# Patient Record
Sex: FEMALE | Race: WHITE
[De-identification: names, ages, dates, MRNs, and addresses within clinical notes are randomized per-mention and may not be internally consistent; named-entity substitution may affect disease eponyms.]

---

## 2017-06-21 ENCOUNTER — HOSPITAL ENCOUNTER (OUTPATIENT)
Dept: HOSPITAL 80 - FIMAGING | Age: 67
End: 2017-06-21
Attending: INTERNAL MEDICINE
Payer: COMMERCIAL

## 2017-06-21 DIAGNOSIS — Z12.31: Primary | ICD-10-CM

## 2017-06-21 PROCEDURE — G0202 SCR MAMMO BI INCL CAD: HCPCS

## 2017-09-20 ENCOUNTER — HOSPITAL ENCOUNTER (EMERGENCY)
Dept: HOSPITAL 80 - FED | Age: 67
Discharge: HOME | End: 2017-09-20
Payer: COMMERCIAL

## 2017-09-20 VITALS — SYSTOLIC BLOOD PRESSURE: 141 MMHG | DIASTOLIC BLOOD PRESSURE: 66 MMHG | TEMPERATURE: 98.1 F

## 2017-09-20 VITALS — HEART RATE: 71 BPM | RESPIRATION RATE: 16 BRPM | OXYGEN SATURATION: 97 %

## 2017-09-20 DIAGNOSIS — E86.9: ICD-10-CM

## 2017-09-20 DIAGNOSIS — I10: ICD-10-CM

## 2017-09-20 DIAGNOSIS — F41.9: ICD-10-CM

## 2017-09-20 DIAGNOSIS — B34.9: Primary | ICD-10-CM

## 2017-09-20 LAB
% IMMATURE GRANULYOCYTES: 0.2 % (ref 0–1.1)
ABSOLUTE IMMATURE GRANULOCYTES: 0.02 10^3/UL (ref 0–0.1)
ABSOLUTE NRBC COUNT: 0 10^3/UL (ref 0–0.01)
ADD DIFF?: NO
ADD MORPH?: NO
ADD SCAN?: NO
ANION GAP SERPL CALC-SCNC: 14 MEQ/L (ref 8–16)
ATYPICAL LYMPHOCYTE FLAG: 20 (ref 0–99)
CALCIUM SERPL-MCNC: 10 MG/DL (ref 8.5–10.4)
CHLORIDE SERPL-SCNC: 105 MEQ/L (ref 97–110)
CO2 SERPL-SCNC: 24 MEQ/L (ref 22–31)
CREAT SERPL-MCNC: 0.9 MG/DL (ref 0.6–1)
ERYTHROCYTE [DISTWIDTH] IN BLOOD BY AUTOMATED COUNT: 13.2 % (ref 11.5–15.2)
FRAGMENT RBC FLAG: 0 (ref 0–99)
GFR SERPL CREATININE-BSD FRML MDRD: > 60 ML/MIN/{1.73_M2}
GLUCOSE SERPL-MCNC: 123 MG/DL (ref 70–100)
HCT VFR BLD CALC: 45.8 % (ref 38–47)
HGB BLD-MCNC: 15.2 G/DL (ref 12.6–16.3)
LEFT SHIFT FLG: 0 (ref 0–99)
LIPEMIA HEMOLYSIS FLAG: 80 (ref 0–99)
MCH RBC BLDCO QN: 30.3 PG (ref 27.9–34.1)
MCHC RBC AUTO-ENTMCNC: 33.2 G/DL (ref 32.4–36.7)
MCV RBC AUTO: 91.2 FL (ref 81.5–99.8)
NRBC-AUTO%: 0 % (ref 0–0.2)
PLATELET # BLD: 255 10^3/UL (ref 150–400)
PLATELET CLUMPS FLAG: 0 (ref 0–99)
PMV BLD AUTO: 10.1 FL (ref 8.7–11.7)
POTASSIUM SERPL-SCNC: 4.1 MEQ/L (ref 3.5–5.2)
RBC # BLD AUTO: 5.02 10^6/UL (ref 4.18–5.33)
SODIUM SERPL-SCNC: 143 MEQ/L (ref 134–144)

## 2017-09-20 PROCEDURE — 96374 THER/PROPH/DIAG INJ IV PUSH: CPT

## 2017-09-20 PROCEDURE — 99285 EMERGENCY DEPT VISIT HI MDM: CPT

## 2017-09-20 PROCEDURE — 93005 ELECTROCARDIOGRAM TRACING: CPT

## 2017-09-20 PROCEDURE — 71020: CPT

## 2017-09-20 PROCEDURE — 96361 HYDRATE IV INFUSION ADD-ON: CPT

## 2017-09-20 NOTE — CPEKG
Heart Rate: 90

RR Interval: 667

P-R Interval: 192

QRSD Interval: 78

QT Interval: 384

QTC Interval: 470

P Axis: 60

QRS Axis: 32

T Wave Axis: 36

EKG Severity - NORMAL ECG -

EKG Impression: SINUS RHYTHM

Electronically Signed By: Guero Quan 20-Sep-2017 19:43:40

## 2017-09-20 NOTE — EDPHY
H & P


Stated Complaint: feeling shakey, high BP


Time Seen by Provider: 09/20/17 19:15


HPI/ROS: 





CHIEF COMPLAINT:  Shaky, cold, anxious





HISTORY OF PRESENT ILLNESS:  The patient is a 67-year-old female with a history 

of anxiety.  She takes Ativan nightly to help her sleep.  She has tapered from 

1.5 mg to 1 mg over the last several months.  She has been taking 1 mg for the 

last 2 weeks.  She states that this morning she began feeling shaky, cold and 

tight all over her body.  She then took her blood pressure this evening and 

noticed that it was elevated.  Her  brought her to the emergency 

department.  She denies chest pain or shortness of breath.  She denies nausea 

vomiting or diarrhea.  No fever.  No cough.  No headache.  No lightheadedness.








REVIEW OF SYSTEMS:


Constitutional:  See HPI


EENTM: denies: blurred vision, double vision, nose congestion


Respiratory: denies: cough, shortness of breath


Cardiac: denies: chest pain, irregular heart rate, lightheadedness, palpitations


Gastrointestinal/Abdominal: denies: abdominal pain, diarrhea, nausea, vomiting, 

blood streaked stools


Genitourinary: denies: dysuria, frequency, hematuria, pain


Musculoskeletal: denies: joint pain, muscle pain


Skin: denies: lesions, rash, jaundice, bruising


Neurological: denies: headache, numbness, paresthesia, tingling, dizziness, 

weakness


Hematologic/Lymphatic: denies: blood clots, easy bleeding, easy bruising


Immunologic/allergic: denies: HIV/AIDS, transplant








EXAM:


GENERAL:  Anxious,  well-nourished 


HEAD:  Atraumatic, normocephalic.


EYES:  Pupils equal round and reactive to light, extraocular movements intact, 

sclera anicteric, conjunctiva are normal.


ENT:  TMs normal, nares patent, oropharynx clear without exudates.  Moist 

mucous membranes.


NECK:  Normal range of motion, supple without lymphadenopathy or JVD.


LUNGS:  Breath sounds clear to auscultation bilaterally and equal.  No wheezes 

rales or rhonchi.


HEART:  Regular rate and rhythm without murmurs, rubs or gallops.


ABDOMEN:  Soft, nontender, normoactive bowel sounds.  No guarding, no rebound.  

No masses appreciated.


BACK:  No CVA tenderness, no spinal tenderness, step-offs or deformities


EXTREMITIES:  Normal range of motion, no pitting or edema.  No clubbing or 

cyanosis.


NEUROLOGICAL:  Cranial nerves II through XII grossly intact.  Normal speech, 

normal gait.  5/5 strength, normal movement in all extremities, normal sensation


PSYCH:  Normal mood, normal affect.


SKIN:  Warm, dry, normal turgor, no visible rashes or lesions.








Source: Patient


Exam Limitations: No limitations





- Personal History


Current Tetanus/Diphtheria Vaccine: Yes


Current Tetanus Diphtheria and Acellular Pertussis (TDAP): Yes


Tetanus Vaccine Date: 2010





- Medical/Surgical History


Hx Asthma: No


Hx Chronic Respiratory Disease: No


Hx Diabetes: No


Hx Cardiac Disease: No


Hx Renal Disease: No


Hx Cirrhosis: No


Hx Alcoholism: No


Hx HIV/AIDS: No


Hx Splenectomy or Spleen Trauma: No


Other PMH: SDH 11-13, appendectomy hysterectomy.  ovaries removed. Gallbladder 

out 2014.  PMH: anxiety; cyclical depression





- Family History


Significant Family History: No pertinent family hx





- Social History


Smoking Status: Never smoked


Alcohol Use: None


Drug Use: None


Constitutional: 


 Initial Vital Signs











Temperature (C)  36.5 C   09/20/17 19:19


 


Heart Rate  99   09/20/17 19:19


 


Respiratory Rate  16   09/20/17 19:19


 


Blood Pressure  174/85 H  09/20/17 19:19


 


O2 Sat (%)  98   09/20/17 19:19








 











O2 Delivery Mode               Room Air














Allergies/Adverse Reactions: 


 





doxycycline Allergy (Verified 09/20/17 19:18)


 


Penicillins Allergy (Verified 09/20/17 19:18)


 








Home Medications: 














 Medication  Instructions  Recorded


 


Cetirizine [ZyRTEC] 10 mg PO HS 04/09/13


 


Estrogens, Conjugated [Premarin] 0.625 mg PO DAILY 11/19/13


 


LORazepam [Ativan] 0.5 mg PO DAILY PRN 10/14/14


 


LaMICtal  01/14/16














Medical Decision Making





- Diagnostics


EKG Interpretation: 





An EKG obtained and was read and documented in trace view.  Please see trace 

view for full reading and report.  Sinus rhythm, no acute ischemic changes 


ED Course/Re-evaluation: 





9:15 p.m. the patient is feeling completely better.  She thinks the Ativan and 

fluids helped tremendously.  I suspect that she might be starting to have a 

viral syndrome with her chills and dry cough exacerbated by anxiety and 

hypertension.  The patient agrees with this as does her .  Her flu test 

will not be back for another 15 or 20 minutes.  I spoke with Dr. Tremayne Kelly 

who will prescribe for Tamiflu if needed.  Patient and  agree with this.


Differential Diagnosis: 





Partial list of the Differential diagnosis considered include but were not 

limited to;  viral syndrome, anxiety, hypertension and although unlikely based 

on the history and physical exam, I also considered acute coronary disease, PE, 

arrhythmia, head injury.  I discussed these differential diagnoses and the plan 

with the patient as well as the usual and expected course.  The patient 

understands that the diagnosis is provisional and that in medicine we are not 

always correct and that further workup is often warranted.  Usual and customary 

warnings were given.  All of the patient's questions were answered.  The 

patient was instructed to return to the emergency department should the 

symptoms at all worsen or return, otherwise to followup with the physician as 

we discussed.





- Data Points


Laboratory Results: 


 Laboratory Results





 09/20/17 19:30 





 09/20/17 19:30 





 











  09/20/17





  19:30


 


TSH  Pending 





  











Medications Given: 


 








Discontinued Medications





Sodium Chloride (Ns)  1,000 mls @ 0 mls/hr IV EDNOW ONE; Wide Open


   PRN Reason: Protocol


   Stop: 09/20/17 19:25


   Last Admin: 09/20/17 19:38 Dose:  1,000 mls


Lorazepam (Ativan Injection)  0.5 mg IVP EDNOW ONE


   Stop: 09/20/17 19:25


   Last Admin: 09/20/17 19:41 Dose:  0.5 mg








Departure





- Departure


Disposition: Home, Routine, Self-Care


Clinical Impression: 


 Viral syndrome, Anxiety





Hypertension


Qualifiers:


 Hypertension type: unspecified Qualified Code(s): I10 - Essential (primary) 

hypertension





Condition: Fair


Instructions:  Viral Syndrome (ED), Hypertension (ED), Anxiety (ED)


Referrals: 


Magalie Vanegas MD [Primary Care Provider] - As per Instructions

## 2018-01-09 ENCOUNTER — HOSPITAL ENCOUNTER (EMERGENCY)
Dept: HOSPITAL 80 - FED | Age: 68
Discharge: HOME | End: 2018-01-09
Payer: COMMERCIAL

## 2018-01-09 VITALS — TEMPERATURE: 97.7 F | OXYGEN SATURATION: 95 %

## 2018-01-09 VITALS — SYSTOLIC BLOOD PRESSURE: 144 MMHG | DIASTOLIC BLOOD PRESSURE: 88 MMHG | HEART RATE: 100 BPM | RESPIRATION RATE: 16 BRPM
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
## 2018-01-09 DIAGNOSIS — S16.1XXA: Primary | ICD-10-CM
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
## 2018-05-27 ENCOUNTER — HOSPITAL ENCOUNTER (EMERGENCY)
Dept: HOSPITAL 80 - FED | Age: 68
Discharge: HOME | End: 2018-05-27
Payer: COMMERCIAL

## 2018-05-27 VITALS — DIASTOLIC BLOOD PRESSURE: 72 MMHG | SYSTOLIC BLOOD PRESSURE: 131 MMHG

## 2018-05-27 DIAGNOSIS — Z76.0: Primary | ICD-10-CM

## 2018-05-27 LAB — PLATELET # BLD: 232 10^3/UL (ref 150–400)

## 2018-05-27 NOTE — EDPHY
H & P


Time Seen by Provider: 05/27/18 14:56


HPI/ROS: 





HPI


Fatigue, nausea.





68-year-old female by private vehicle with her .  This patient has a 

history of chronic depression and anxiety.  She has been taking Lamictal as 

well as lorazepam at night for 3 years.  She had no event in her family which 

caused her depression to worsen several weeks ago.  She was started on 

Trileptal 150 mg daily 2 weeks ago.  This dose has been increased to 300 mg 

daily.  She takes this medication twice daily.  She reports that over the last 

2 days and several days since starting the higher dose she has had nausea which 

is worse in the morning but no vomiting, chronic fatigue, intermittent problems 

with balance.  She spoke with the on-call psychiatrist Dr. Carey last night 

and was instructed to stop taking the Trileptal twice daily and just take it at 

night.  She did not take a dose this morning but did take a dose last night.





ROS:





Constitutional:  No fever, no chills.  As above.


Eyes:  No discharge.  No changes in vision.


ENT:  No sore throat.  No nasal congestion or rhinorrhea.


Respiratory:  No cough.  No shortness of breath.


Cardiac:  No chest pain, no palpitations.


Gastrointestinal:  No abdominal pain, no vomiting, no diarrhea.  As above.


Genitourinary:  No hematuria.  No dysuria or increased frequency with urination.


Musculoskeletal:  No back pain.  No neck pain.  No myalgias or arthralgias.


Skin:  No rashes.


Neurological:  No headache.  No focal weakness or altered sensation.  As above.





Past medical history:  Appendectomy, hysterectomy, oophorectomy, cholecystectomy

, anxiety, cyclical depression.





Social history:  Here with her .  Denies alcohol.  Nonsmoker.





Physical Exam:





General Appearance:  Alert, no distress.  This patient is responding to 

questions appropriately and in full sentences.  This patient appears well-

hydrated and well-nourished.


Eyes:  Pupils equal and round at 3-2 mm bilaterally, no pallor or injection.  

No lid edema, erythema or injection.  No nystagmus.


Respiratory:  There are no retractions, lungs are clear to auscultation with 

good air movement bilaterally.


Cardiovascular:  Regular rate and rhythm.  No murmur.


Gastrointestinal:  Abdomen is soft and nontender, no masses, bowel sounds 

normal.  No focal tenderness at McBurney's point.  No Encarnacion sign.


Neurological:  Motor sensory function is grossly intact.  Cranial nerves are 

normal.  Gait is normal.


Skin:  Warm and dry, no rashes.


Musculoskeletal:  Neck is supple and nontender.


Extremities are symmetrical.  All joints range without pain or impingement.


Psychiatric:  No agitation.  No depression.





Database:





EKG:





EKG time is 5:00 p.m.; EKG shows a narrow complex normal sinus rhythm with a 

ventricular rate of 63.  The VT, QRS, QT intervals are within normal limits.  

There are no ST-T wave changes indicative of ischemic or injury pattern.  No 

evidence of right heart strain.  Interpreted by me.





Imaging:





Procedures:





Emergency department course:





Vital signs reviewed.  The patient is mildly hypertensive.  Vital signs are 

otherwise normal.  Her presentation is consistent with a medication reaction 

and fits the side effect profile in particular of Trileptal.  I feel that CVA 

or other acute organic neurologic process is unlikely.





5:00 p.m., the patient was re-evaluated.  She is resting comfortably at this 

time.  No change in her neurologic status.  Results of her emergency department 

workup discussed with her.  She feels comfortable going home and I feel she is 

safe for discharge.  She will continue the once daily dosing, down from twice 

daily and speak with her psychiatrist or the on-call psychiatrist this evening 

or tomorrow about tapering this does down and finding a replacement medication.

  Follow-up was reviewed with her thoroughly.  Return to emergency department 

precautions discussed.  All of her questions were answered.  She was discharged 

in good condition from the emergency department.





Differential Diagnosis:





The differential diagnosis on this patient includes but is not limited to 

medication reaction.  CVA, serotonin syndrome, neuroleptic malignant syndrome, 

central versus peripheral vertigo, infectious etiology unlikely.  This 

represents a partial list of diagnoses considered.  These considerations are 

based on history, physical exam, past history, reassessment and diagnostic 

testing.


Smoking Status: Never smoked


Constitutional: 


 Initial Vital Signs











Temperature (C)  36.8 C   05/27/18 14:39


 


Heart Rate  65   05/27/18 14:39


 


Respiratory Rate  16   05/27/18 14:39


 


Blood Pressure  140/75 H  05/27/18 14:39


 


O2 Sat (%)  96   05/27/18 14:39








 











O2 Delivery Mode               Room Air














Allergies/Adverse Reactions: 


 





doxycycline Allergy (Verified 05/27/18 14:38)


 








Home Medications: 














 Medication  Instructions  Recorded


 


Cetirizine [ZyRTEC] 10 mg PO HS 04/09/13


 


Estrogens, Conjugated [Premarin] 0.625 mg PO DAILY 11/19/13


 


LORazepam [Ativan] 0.5 mg PO DAILY PRN 10/14/14


 


LaMICtal  01/14/16


 


Oxcarbazepine  05/27/18














Medical Decision Making





- Data Points


Laboratory Results: 


 Laboratory Results





 05/27/18 15:00 





 05/27/18 15:00 





 











  05/27/18 05/27/18 05/27/18





  15:00 15:00 15:00


 


WBC      5.97 10^3/uL 10^3/uL





     (3.80-9.50) 


 


RBC      4.72 10^6/uL 10^6/uL





     (4.18-5.33) 


 


Hgb      14.1 g/dL g/dL





     (12.6-16.3) 


 


Hct      43.3 % %





     (38.0-47.0) 


 


MCV      91.7 fL fL





     (81.5-99.8) 


 


MCH      29.9 pg pg





     (27.9-34.1) 


 


MCHC      32.6 g/dL g/dL





     (32.4-36.7) 


 


RDW      13.5 % %





     (11.5-15.2) 


 


Plt Count      232 10^3/uL 10^3/uL





     (150-400) 


 


MPV      9.6 fL fL





     (8.7-11.7) 


 


Neut % (Auto)      58.2 % %





     (39.3-74.2) 


 


Lymph % (Auto)      30.0 % %





     (15.0-45.0) 


 


Mono % (Auto)      8.2 % %





     (4.5-13.0) 


 


Eos % (Auto)      2.3 % %





     (0.6-7.6) 


 


Baso % (Auto)      1.0 % %





     (0.3-1.7) 


 


Nucleat RBC Rel Count      0.0 % %





     (0.0-0.2) 


 


Absolute Neuts (auto)      3.47 10^3/uL 10^3/uL





     (1.70-6.50) 


 


Absolute Lymphs (auto)      1.79 10^3/uL 10^3/uL





     (1.00-3.00) 


 


Absolute Monos (auto)      0.49 10^3/uL 10^3/uL





     (0.30-0.80) 


 


Absolute Eos (auto)      0.14 10^3/uL 10^3/uL





     (0.03-0.40) 


 


Absolute Basos (auto)      0.06 10^3/uL 10^3/uL





     (0.02-0.10) 


 


Absolute Nucleated RBC      0.00 10^3/uL 10^3/uL





     (0-0.01) 


 


Immature Gran %      0.3 % %





     (0.0-1.1) 


 


Immature Gran #      0.02 10^3/uL 10^3/uL





     (0.00-0.10) 


 


Sodium    143 mEq/L mEq/L  





    (135-145)  


 


Potassium    4.2 mEq/L mEq/L  





    (3.3-5.0)  


 


Chloride    103 mEq/L mEq/L  





    ()  


 


Carbon Dioxide    29 mEq/l mEq/l  





    (22-31)  


 


Anion Gap    11 mEq/L mEq/L  





    (8-16)  


 


BUN    19 mg/dL mg/dL  





    (7-23)  


 


Creatinine    0.8 mg/dL mg/dL  





    (0.6-1.0)  


 


Estimated GFR    > 60   





    


 


Glucose    93 mg/dL mg/dL  





    ()  


 


Calcium    9.5 mg/dL mg/dL  





    (8.5-10.4)  


 


Total Bilirubin  0.5 mg/dL mg/dL    





   (0.1-1.4)   


 


Conjugated Bilirubin  0.5 mg/dL mg/dL    





   (0.0-0.5)   


 


Unconjugated Bilirubin  0.0 mg/dL mg/dL    





   (0.0-1.1)   


 


AST  26 IU/L IU/L    





   (14-46)   


 


ALT  27 IU/L IU/L    





   (9-52)   


 


Alkaline Phosphatase  69 IU/L IU/L    





   ()   


 


Total Protein  7.0 g/dL g/dL    





   (6.3-8.2)   


 


Albumin  4.4 g/dL g/dL    





   (3.5-5.0)   


 


Lipase  85 IU/L IU/L    





   ()   


 


TSH  1.890 uIU/mL uIU/mL    





   (0.465-4.680)   














Departure





- Departure


Disposition: Home, Routine, Self-Care


Clinical Impression: 


 Medication reaction





Condition: Good


Instructions:  Fatigue (ED)


Additional Instructions: 


Read and follow provided instructions.





Follow-up with your psychiatrist within the next 2 days for re-evaluation and 

discussion of the replacement medication for you're Trileptal.  Use no more 

than 1 dose per day at this time and discuss with your psychiatrist tapering 

this medication verses stopping completely.





Return to the emergency department for worsening symptoms or other serious 

concerns.


Referrals: 


Magalie Vanegas MD [Primary Care Provider] - As per Instructions

## 2018-05-27 NOTE — CPEKG
Heart Rate: 63

RR Interval: 952

P-R Interval: 196

QRSD Interval: 72

QT Interval: 408

QTC Interval: 418

P Axis: 64

QRS Axis: 28

T Wave Axis: 50

EKG Severity - NORMAL ECG -

EKG Impression: SINUS RHYTHM

Electronically Signed By: McCollester, Laughlin 27-May-2018 23:14:02

## 2018-06-22 ENCOUNTER — HOSPITAL ENCOUNTER (OUTPATIENT)
Dept: HOSPITAL 80 - FIMAGING | Age: 68
End: 2018-06-22
Attending: INTERNAL MEDICINE
Payer: COMMERCIAL

## 2018-06-22 DIAGNOSIS — Z12.31: Primary | ICD-10-CM

## 2018-12-18 ENCOUNTER — HOSPITAL ENCOUNTER (EMERGENCY)
Dept: HOSPITAL 80 - FED | Age: 68
Discharge: HOME | End: 2018-12-18
Payer: COMMERCIAL

## 2018-12-18 VITALS — SYSTOLIC BLOOD PRESSURE: 126 MMHG | DIASTOLIC BLOOD PRESSURE: 75 MMHG

## 2018-12-18 DIAGNOSIS — E86.9: ICD-10-CM

## 2018-12-18 DIAGNOSIS — R55: Primary | ICD-10-CM

## 2018-12-18 LAB — PLATELET # BLD: 226 10^3/UL (ref 150–400)

## 2018-12-18 NOTE — CPEKG
Test Reason : OPEN

Blood Pressure : ***/*** mmHG

Vent. Rate : 065 BPM     Atrial Rate : 000 BPM

   P-R Int : 197 ms          QRS Dur : 090 ms

    QT Int : 418 ms       P-R-T Axes : 083 009 050 degrees

   QTc Int : 435 ms

 

Sinus arrhythmia

Probable left atrial enlargement

 

Confirmed by Trey Betts ([id number]) on 12/18/2018 1:53:07 PM

 

Referred By:             Confirmed By:Trey Betts

## 2018-12-18 NOTE — EDPHY
H & P


Time Seen by Provider: 12/18/18 13:28


HPI/ROS: 





CHIEF COMPLAINT:  Presyncope





HISTORY OF PRESENT ILLNESS:  The patient presents to the ED with symptoms of 

presyncope that began earlier this morning after getting out of a hot tub.  The 

patient did try to drink some water to improve her symptoms however continues 

to have symptoms of presyncope.  She denies any chest pain or shortness of 

breath.  The patient denies any cardiac or pulmonary condition.  The patient 

does not take medications for hypertension.  The patient denies any headache, 

numbness, weakness, dyspnea or asymmetric calf pain or swelling.  She has no 

symptoms at rest or with head movement.





REVIEW OF SYSTEMS:


A comprehensive 10 point review of systems is otherwise negative aside from 

elements mentioned in the history of present illness.








Source: Patient


Exam Limitations: No limitations





- Personal History


Tetanus Vaccine Date: 2010





- Medical/Surgical History


Hx Asthma: No


Hx Chronic Respiratory Disease: No


Hx Diabetes: No


Hx Cardiac Disease: No


Hx Renal Disease: No


Hx Cirrhosis: No


Hx Alcoholism: No


Hx HIV/AIDS: No


Hx Splenectomy or Spleen Trauma: No


Other PMH: SDH 11-13, appendectomy hysterectomy.  ovaries removed. Gallbladder 

out 2014.  PMH: anxiety; cyclical depression





- Social History


Smoking Status: Never smoked





- Physical Exam


Exam: 





General Appearance:  Alert, no distress


Eyes:  Pupils equal and round no pallor or injection


ENT, Mouth:  Mucous membranes moist


Respiratory:  There are no retractions, lungs are clear to auscultation


Cardiovascular:  Regular rate and rhythm


Gastrointestinal:  Abdomen is soft and nontender, no masses, bowel sounds normal


Neurological:  A&O, normal motor function, normal sensory exam, normal cranial 

nerves


Skin:  Warm and dry, no rashes


Musculoskeletal:  Neck is supple nontender


Extremities:  symmetrical, full range of motion








Constitutional: 


 Initial Vital Signs











Temperature (C)  36.5 C   12/18/18 13:26


 


Heart Rate  70   12/18/18 13:26


 


Respiratory Rate  18   12/18/18 13:26


 


Blood Pressure  126/62 H  12/18/18 13:26


 


O2 Sat (%)  98   12/18/18 13:26








 











O2 Delivery Mode               Room Air














Allergies/Adverse Reactions: 


 





doxycycline Allergy (Verified 12/18/18 13:25)


 








Home Medications: 














 Medication  Instructions  Recorded


 


LORazepam [Ativan] 0.5 mg PO DAILY PRN 10/14/14


 


LaMICtal  01/14/16














Medical Decision Making





- Diagnostics


EKG Interpretation: 





EKG:  Complete interpretation has been separately recorded in the [employer] 

archive.  Summary impression:  Sinus rhythm, rate 65


ED Course/Re-evaluation: 





The patient presents to the ED with orthostasis in the setting of a prolonged 

stay in a hot tub.  The patient has no evidence of hyperthermia.  The patient's 

EKG demonstrates no evidence of arrhythmia.  CBC and metabolic panel are within 

normal limits.





The patient had an IV established.  She received 2 L of normal saline in the 

emergency department.





The patient was kept on a cardiac monitor.  She received serial examinations 

over a 2 hr period.





I re-evaluated the patient at 3:30 p.m. And she is now ambulatory without any 

complaints of orthostasis.  I do feel that she can safely be discharged home at 

this point time.  She will be given customary aftercare instructions and return 

precautions.








Differential Diagnosis: 





Differential diagnosis considered includes orthostatic hypotension, dehydration

, metabolic abnormality, renal failure, anemia, arrhythmia





- Data Points


Laboratory Results: 


 Laboratory Results





 12/18/18 13:43 





 12/18/18 13:43 





 











  12/18/18 12/18/18 12/18/18





  14:41 13:43 13:43


 


WBC      6.36 10^3/uL 10^3/uL





     (3.80-9.50) 


 


RBC      4.97 10^6/uL 10^6/uL





     (4.18-5.33) 


 


Hgb      14.6 g/dL g/dL





     (12.6-16.3) 


 


Hct      45.9 % %





     (38.0-47.0) 


 


MCV      92.4 fL fL





     (81.5-99.8) 


 


MCH      29.4 pg pg





     (27.9-34.1) 


 


MCHC      31.8 g/dL L g/dL





     (32.4-36.7) 


 


RDW      13.3 % %





     (11.5-15.2) 


 


Plt Count      226 10^3/uL 10^3/uL





     (150-400) 


 


MPV      10.0 fL fL





     (8.7-11.7) 


 


Neut % (Auto)      57.6 % %





     (39.3-74.2) 


 


Lymph % (Auto)      32.7 % %





     (15.0-45.0) 


 


Mono % (Auto)      6.8 % %





     (4.5-13.0) 


 


Eos % (Auto)      1.7 % %





     (0.6-7.6) 


 


Baso % (Auto)      0.9 % %





     (0.3-1.7) 


 


Nucleat RBC Rel Count      0.0 % %





     (0.0-0.2) 


 


Absolute Neuts (auto)      3.66 10^3/uL 10^3/uL





     (1.70-6.50) 


 


Absolute Lymphs (auto)      2.08 10^3/uL 10^3/uL





     (1.00-3.00) 


 


Absolute Monos (auto)      0.43 10^3/uL 10^3/uL





     (0.30-0.80) 


 


Absolute Eos (auto)      0.11 10^3/uL 10^3/uL





     (0.03-0.40) 


 


Absolute Basos (auto)      0.06 10^3/uL 10^3/uL





     (0.02-0.10) 


 


Absolute Nucleated RBC      0.00 10^3/uL 10^3/uL





     (0-0.01) 


 


Immature Gran %      0.3 % %





     (0.0-1.1) 


 


Immature Gran #      0.02 10^3/uL 10^3/uL





     (0.00-0.10) 


 


Sodium    142 mEq/L mEq/L  





    (135-145)  


 


Potassium    4.1 mEq/L mEq/L  





    (3.5-5.2)  


 


Chloride    105 mEq/L mEq/L  





    ()  


 


Carbon Dioxide    30 mEq/l mEq/l  





    (22-31)  


 


Anion Gap    7 mEq/L mEq/L  





    (6-14)  


 


BUN    17 mg/dL mg/dL  





    (7-23)  


 


Creatinine    1.0 mg/dL mg/dL  





    (0.6-1.0)  


 


Estimated GFR    55   





    


 


Glucose    113 mg/dL H mg/dL  





    ()  


 


Calcium    9.5 mg/dL mg/dL  





    (8.5-10.4)  


 


POC Troponin I  0.01 ng/mL ng/mL    





   (0.00-0.08)   











Medications Given: 


 








Discontinued Medications





Sodium Chloride (Ns)  1,000 mls @ 0 mls/hr IV EDNOW ONE; Wide [facility]


   PRN Reason: Protocol


   Stop: 12/18/18 13:50


   Last Admin: 12/18/18 13:53 Dose:  1,000 mls





Point of Care Test Results: 


 Chemistry











  12/18/18





  14:41


 


POC Troponin I  0.01 ng/mL ng/mL





   (0.00-0.08) 














Departure





- Departure


Disposition: Home, Routine, Self-Care


Clinical Impression: 


 Vasovagal near-syncope





Condition: Good


Instructions:  Syncope (ED)


Additional Instructions: 


1.  The laboratory testing done in the ED today is normal.


2. Your EKG also demonstrates no evidence of an arrhythmia.


3. I do believe you had lightheadedness from dehydration from your hot tub 

today.  I do recommend increasing your fluid intake when using the hot tub.  I 

also recommend prolonged hot tub exposure.








Referrals: 


Flakito Paredes MD [Primary Care Provider] - As per Instructions

## 2019-06-17 ENCOUNTER — HOSPITAL ENCOUNTER (EMERGENCY)
Dept: HOSPITAL 80 - FED | Age: 69
Discharge: HOME | End: 2019-06-17
Payer: COMMERCIAL

## 2019-06-27 ENCOUNTER — HOSPITAL ENCOUNTER (OUTPATIENT)
Dept: HOSPITAL 80 - FIMAGING | Age: 69
End: 2019-06-27
Payer: MEDICARE